# Patient Record
Sex: MALE | Race: OTHER | HISPANIC OR LATINO | Employment: STUDENT | URBAN - METROPOLITAN AREA
[De-identification: names, ages, dates, MRNs, and addresses within clinical notes are randomized per-mention and may not be internally consistent; named-entity substitution may affect disease eponyms.]

---
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
## 2024-01-06 ENCOUNTER — HOSPITAL ENCOUNTER (EMERGENCY)
Facility: HOSPITAL | Age: 19
Discharge: PSYCHIATRIC OR SUBSTANCE USE FACILITY | End: 2024-01-06
Attending: STUDENT IN AN ORGANIZED HEALTH CARE EDUCATION/TRAINING PROGRAM
Payer: MEDICAID

## 2024-01-06 VITALS
TEMPERATURE: 99 F | OXYGEN SATURATION: 98 % | DIASTOLIC BLOOD PRESSURE: 80 MMHG | BODY MASS INDEX: 19.79 KG/M2 | SYSTOLIC BLOOD PRESSURE: 137 MMHG | RESPIRATION RATE: 16 BRPM | WEIGHT: 150 LBS | HEART RATE: 90 BPM

## 2024-01-06 DIAGNOSIS — R45.1 AGITATION: ICD-10-CM

## 2024-01-06 DIAGNOSIS — R45.851 SUICIDAL IDEATION: Primary | ICD-10-CM

## 2024-01-06 LAB
ALBUMIN SERPL BCP-MCNC: 4.8 G/DL (ref 3.5–5.2)
ALP SERPL-CCNC: 73 U/L (ref 55–135)
ALT SERPL W/O P-5'-P-CCNC: 10 U/L (ref 10–44)
AMPHET+METHAMPHET UR QL: NEGATIVE
ANION GAP SERPL CALC-SCNC: 13 MMOL/L (ref 8–16)
APAP SERPL-MCNC: <3 UG/ML (ref 10–20)
AST SERPL-CCNC: 18 U/L (ref 10–40)
BARBITURATES UR QL SCN>200 NG/ML: NEGATIVE
BASOPHILS # BLD AUTO: 0.04 K/UL (ref 0–0.2)
BASOPHILS NFR BLD: 0.6 % (ref 0–1.9)
BENZODIAZ UR QL SCN>200 NG/ML: NEGATIVE
BILIRUB SERPL-MCNC: 2.8 MG/DL (ref 0.1–1)
BILIRUB UR QL STRIP: NEGATIVE
BUN SERPL-MCNC: 7 MG/DL (ref 6–20)
BZE UR QL SCN: NEGATIVE
CALCIUM SERPL-MCNC: 9.8 MG/DL (ref 8.7–10.5)
CANNABINOIDS UR QL SCN: ABNORMAL
CHLORIDE SERPL-SCNC: 105 MMOL/L (ref 95–110)
CLARITY UR REFRACT.AUTO: CLEAR
CO2 SERPL-SCNC: 25 MMOL/L (ref 23–29)
COLOR UR AUTO: YELLOW
CREAT SERPL-MCNC: 1.2 MG/DL (ref 0.5–1.4)
CREAT UR-MCNC: 169 MG/DL (ref 23–375)
DIFFERENTIAL METHOD BLD: NORMAL
EOSINOPHIL # BLD AUTO: 0.2 K/UL (ref 0–0.5)
EOSINOPHIL NFR BLD: 2.8 % (ref 0–8)
ERYTHROCYTE [DISTWIDTH] IN BLOOD BY AUTOMATED COUNT: 11.9 % (ref 11.5–14.5)
EST. GFR  (NO RACE VARIABLE): >60 ML/MIN/1.73 M^2
ETHANOL SERPL-MCNC: <10 MG/DL
GLUCOSE SERPL-MCNC: 108 MG/DL (ref 70–110)
GLUCOSE UR QL STRIP: NEGATIVE
HCT VFR BLD AUTO: 51.2 % (ref 40–54)
HCV AB SERPL QL IA: NORMAL
HGB BLD-MCNC: 16.9 G/DL (ref 14–18)
HGB UR QL STRIP: NEGATIVE
HIV 1+2 AB+HIV1 P24 AG SERPL QL IA: NORMAL
IMM GRANULOCYTES # BLD AUTO: 0 K/UL (ref 0–0.04)
IMM GRANULOCYTES NFR BLD AUTO: 0 % (ref 0–0.5)
KETONES UR QL STRIP: NEGATIVE
LEUKOCYTE ESTERASE UR QL STRIP: NEGATIVE
LYMPHOCYTES # BLD AUTO: 2.5 K/UL (ref 1–4.8)
LYMPHOCYTES NFR BLD: 37.2 % (ref 18–48)
MCH RBC QN AUTO: 28.9 PG (ref 27–31)
MCHC RBC AUTO-ENTMCNC: 33 G/DL (ref 32–36)
MCV RBC AUTO: 88 FL (ref 82–98)
METHADONE UR QL SCN>300 NG/ML: NEGATIVE
MONOCYTES # BLD AUTO: 0.6 K/UL (ref 0.3–1)
MONOCYTES NFR BLD: 8.7 % (ref 4–15)
NEUTROPHILS # BLD AUTO: 3.4 K/UL (ref 1.8–7.7)
NEUTROPHILS NFR BLD: 50.7 % (ref 38–73)
NITRITE UR QL STRIP: NEGATIVE
NRBC BLD-RTO: 0 /100 WBC
OPIATES UR QL SCN: NEGATIVE
PCP UR QL SCN>25 NG/ML: NEGATIVE
PH UR STRIP: 6 [PH] (ref 5–8)
PLATELET # BLD AUTO: 261 K/UL (ref 150–450)
PMV BLD AUTO: 9.6 FL (ref 9.2–12.9)
POTASSIUM SERPL-SCNC: 3.2 MMOL/L (ref 3.5–5.1)
PROT SERPL-MCNC: 8.4 G/DL (ref 6–8.4)
PROT UR QL STRIP: NEGATIVE
RBC # BLD AUTO: 5.85 M/UL (ref 4.6–6.2)
SODIUM SERPL-SCNC: 143 MMOL/L (ref 136–145)
SP GR UR STRIP: 1.01 (ref 1–1.03)
T4 FREE SERPL-MCNC: 1.22 NG/DL (ref 0.71–1.51)
TOXICOLOGY INFORMATION: ABNORMAL
TSH SERPL DL<=0.005 MIU/L-ACNC: 5.96 UIU/ML (ref 0.4–4)
URN SPEC COLLECT METH UR: NORMAL
WBC # BLD AUTO: 6.75 K/UL (ref 3.9–12.7)

## 2024-01-06 PROCEDURE — 63600175 PHARM REV CODE 636 W HCPCS

## 2024-01-06 PROCEDURE — 96372 THER/PROPH/DIAG INJ SC/IM: CPT

## 2024-01-06 PROCEDURE — 85025 COMPLETE CBC W/AUTO DIFF WBC: CPT | Performed by: STUDENT IN AN ORGANIZED HEALTH CARE EDUCATION/TRAINING PROGRAM

## 2024-01-06 PROCEDURE — 84439 ASSAY OF FREE THYROXINE: CPT | Performed by: STUDENT IN AN ORGANIZED HEALTH CARE EDUCATION/TRAINING PROGRAM

## 2024-01-06 PROCEDURE — 80053 COMPREHEN METABOLIC PANEL: CPT | Performed by: STUDENT IN AN ORGANIZED HEALTH CARE EDUCATION/TRAINING PROGRAM

## 2024-01-06 PROCEDURE — 82077 ASSAY SPEC XCP UR&BREATH IA: CPT | Performed by: STUDENT IN AN ORGANIZED HEALTH CARE EDUCATION/TRAINING PROGRAM

## 2024-01-06 PROCEDURE — 81003 URINALYSIS AUTO W/O SCOPE: CPT | Mod: 59 | Performed by: STUDENT IN AN ORGANIZED HEALTH CARE EDUCATION/TRAINING PROGRAM

## 2024-01-06 PROCEDURE — 80307 DRUG TEST PRSMV CHEM ANLYZR: CPT | Performed by: STUDENT IN AN ORGANIZED HEALTH CARE EDUCATION/TRAINING PROGRAM

## 2024-01-06 PROCEDURE — 86803 HEPATITIS C AB TEST: CPT | Performed by: PHYSICIAN ASSISTANT

## 2024-01-06 PROCEDURE — 63600175 PHARM REV CODE 636 W HCPCS: Performed by: STUDENT IN AN ORGANIZED HEALTH CARE EDUCATION/TRAINING PROGRAM

## 2024-01-06 PROCEDURE — 87389 HIV-1 AG W/HIV-1&-2 AB AG IA: CPT | Performed by: PHYSICIAN ASSISTANT

## 2024-01-06 PROCEDURE — 99285 EMERGENCY DEPT VISIT HI MDM: CPT

## 2024-01-06 PROCEDURE — 96372 THER/PROPH/DIAG INJ SC/IM: CPT | Performed by: STUDENT IN AN ORGANIZED HEALTH CARE EDUCATION/TRAINING PROGRAM

## 2024-01-06 PROCEDURE — 80143 DRUG ASSAY ACETAMINOPHEN: CPT | Performed by: STUDENT IN AN ORGANIZED HEALTH CARE EDUCATION/TRAINING PROGRAM

## 2024-01-06 PROCEDURE — 84443 ASSAY THYROID STIM HORMONE: CPT | Performed by: STUDENT IN AN ORGANIZED HEALTH CARE EDUCATION/TRAINING PROGRAM

## 2024-01-06 RX ORDER — DROPERIDOL 2.5 MG/ML
5 INJECTION, SOLUTION INTRAMUSCULAR; INTRAVENOUS
Status: COMPLETED | OUTPATIENT
Start: 2024-01-06 | End: 2024-01-06

## 2024-01-06 RX ORDER — LORAZEPAM 2 MG/ML
INJECTION INTRAMUSCULAR
Status: COMPLETED
Start: 2024-01-06 | End: 2024-01-06

## 2024-01-06 RX ORDER — LORAZEPAM 2 MG/ML
2 INJECTION INTRAMUSCULAR
Status: COMPLETED | OUTPATIENT
Start: 2024-01-06 | End: 2024-01-06

## 2024-01-06 RX ORDER — DIPHENHYDRAMINE HYDROCHLORIDE 50 MG/ML
INJECTION, SOLUTION INTRAMUSCULAR; INTRAVENOUS
Status: DISCONTINUED
Start: 2024-01-06 | End: 2024-01-06 | Stop reason: WASHOUT

## 2024-01-06 RX ORDER — HALOPERIDOL 5 MG/ML
INJECTION INTRAMUSCULAR
Status: DISCONTINUED
Start: 2024-01-06 | End: 2024-01-06 | Stop reason: WASHOUT

## 2024-01-06 RX ADMIN — LORAZEPAM 2 MG: 2 INJECTION INTRAMUSCULAR at 02:01

## 2024-01-06 RX ADMIN — LORAZEPAM 2 MG: 2 INJECTION INTRAMUSCULAR; INTRAVENOUS at 02:01

## 2024-01-06 RX ADMIN — DROPERIDOL 5 MG: 2.5 INJECTION, SOLUTION INTRAMUSCULAR; INTRAVENOUS at 02:01

## 2024-01-06 NOTE — ED PROVIDER NOTES
"Encounter Date: 1/6/2024       History     Chief Complaint   Patient presents with    Psychiatric Evaluation     JPSO brought in with OPC. Pt denies SI/HI. Per OPC "pt wants to sleep until he dies"     18 y.o. male with psychiatric diagnoses including depression, adjustment disorder, history of suicide ideation presents via EMS as OPC for reporting suicide ideation to his mother.  Patient reports he wanted to go to sleep until he dies.  On evaluation, patient initially denies feeling suicidal or stating anything to his mother.  Later in the interview he does report stating something about a some Lyrica about going to sleep and not waking up.  Patient has history of suicidal ideation in the past and has been hospitalized.  He denies any other symptoms. History limited due to patient becoming uncooperative with history and exam    The history is provided by the patient and medical records.     Review of patient's allergies indicates:  No Known Allergies  Past Medical History:   Diagnosis Date    ADHD      No past surgical history on file.  No family history on file.  Social History     Tobacco Use    Smoking status: Never   Substance Use Topics    Alcohol use: Never    Drug use: Yes     Types: Marijuana     Review of Systems   Reason unable to perform ROS: See HPI for relevant ROS.       Physical Exam     Initial Vitals [01/06/24 0122]   BP Pulse Resp Temp SpO2   (!) 150/98 65 20 97.9 °F (36.6 °C) 98 %      MAP       --         Physical Exam    Nursing note and vitals reviewed.  Constitutional:   Alert, speaking full sentences, frustrated/volatile   Eyes: Conjunctivae and EOM are normal. No scleral icterus.   Pulmonary/Chest: No respiratory distress.   Musculoskeletal:         General: No tenderness or edema.     Neurological: He is alert and oriented to person, place, and time.   Ambulatory, moving all extremities spontaneously   Skin: Skin is warm and dry.   Psychiatric:   Volatile mood  Linear, organized " speech  Denies SI         ED Course   Procedures  Labs Reviewed   COMPREHENSIVE METABOLIC PANEL - Abnormal; Notable for the following components:       Result Value    Potassium 3.2 (*)     Total Bilirubin 2.8 (*)     All other components within normal limits    Narrative:     Release to patient->Immediate   TSH - Abnormal; Notable for the following components:    TSH 5.965 (*)     All other components within normal limits    Narrative:     Release to patient->Immediate   ACETAMINOPHEN LEVEL - Abnormal; Notable for the following components:    Acetaminophen (Tylenol), Serum <3.0 (*)     All other components within normal limits    Narrative:     Release to patient->Immediate   HIV 1 / 2 ANTIBODY    Narrative:     Release to patient->Immediate   HEPATITIS C ANTIBODY    Narrative:     Release to patient->Immediate   CBC W/ AUTO DIFFERENTIAL    Narrative:     Release to patient->Immediate   URINALYSIS, REFLEX TO URINE CULTURE    Narrative:     Specimen Source->Urine   ALCOHOL,MEDICAL (ETHANOL)    Narrative:     Release to patient->Immediate   DRUG SCREEN PANEL, URINE EMERGENCY   T4, FREE          Imaging Results    None          Medications   LORazepam injection 2 mg (2 mg Intramuscular Given 1/6/24 0210)   droPERidol injection 5 mg (5 mg Intramuscular Given 1/6/24 0210)     Medical Decision Making  18 y.o. male with psychiatric diagnoses including depression, adjustment disorder, history of suicidal ideation presents via EMS as OPC for suicidal ideation  Patient evaluated immediately on arrival to the ED out of concern for acute psychiatric condition. Safety measures taken  Belongings stored in bag, patient placed in paper scrubs, placed under one on one observation in the emergency department  Per report, patient was reporting suicide ideation, he has extensive history of suicide ideation in the past.  I was unable to elicit specific surrounding his suicidal thoughts or what he had expressed to his mother, suspect  patient is withholding some details about his condition  Patient placed under PEC for danger to self.  This was discussed with patient  Patient currently on Risperdal, Zoloft, Effexor  Differentials include suicide ideation, depression, substance induced mood disorder, polysubstance use, alcohol intoxication  Labs drawn for medical evaluation  Will require transfer to inpatient psychiatric facility after initial medical evaluation    Amount and/or Complexity of Data Reviewed  Independent Historian: EMS  External Data Reviewed: notes.  Labs: ordered.    Risk  Prescription drug management.                  Medically cleared for psychiatry placement: 1/6/2024  5:23 AM               Restraint Note         /80   Pulse 90   Temp 99 °F (37.2 °C) (Oral)   Resp 16   Wt 68 kg (150 lb)   SpO2 98%   BMI 19.79 kg/m²     Time: 5:22 AM    Patient's immediate situation:  In ED for suicide ideation, under pec.  Non redirectable agitation, leaving the room while under pec.  Attempted to discussed with patient but unable to redirect    Reaction to intervention:  Patient redirected with chemical and physical restraints, improved    Medications/behavioral condition:  Patient received droperidol and Ativan    Restraint status:  Hard restraints placed initially, re-evaluated patient and removed restraint order    Clinical Impression:  Final diagnoses:  [R45.851] Suicidal ideation (Primary)  [R45.1] Agitation          ED Disposition Condition    Transfer to Psych Facility Stable          ED Prescriptions    None       Follow-up Information    None          Reginald Stacy MD  01/06/24 0097

## 2024-01-06 NOTE — ED NOTES
Pt remains in paper scrubs, resting in stretcher comfortably - with side rails up, locked, and in lowest position. Violent restraints applied. Chest rise and fall noted; breathing equal, even, and unlabored. Sitter remains at bedside in direct visual contact, charting per protocol every 15 minutes. No equipment or belongings are in the patients room to prevent self harm or injury. Pt aware of plan of care.

## 2024-01-06 NOTE — ED NOTES
"Wilton Daley, a 18 y.o. male presents to the ED w/ complaint of Psychiatric Evaluation   Brought in by JPSO with OPC. Pt states "I want to sleep until I die". Pt denies SI/HI     Triage note:  Chief Complaint   Patient presents with    Psychiatric Evaluation     JPSO brought in with OPC. Pt denies SI/HI. Per OPC "pt wants to sleep until he dies"     Review of patient's allergies indicates:  No Known Allergies  Past Medical History:   Diagnosis Date    ADHD      "
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
## 2024-01-06 NOTE — ED NOTES
Pt remains in paper scrubs, resting in stretcher comfortably. No signs of distress noted. Sitter remains at bedside in direct visual contact, charting per protocol every 15 minutes. No equipment or belongings are in the patients room. Plan of care on going.

## 2024-01-07 PROBLEM — M54.2 NECK PAIN: Status: ACTIVE | Noted: 2024-01-07

## 2024-01-07 PROBLEM — J30.2 SEASONAL ALLERGIC RHINITIS: Status: ACTIVE | Noted: 2024-01-07

## 2024-01-07 PROBLEM — R79.89 ELEVATED TSH: Status: ACTIVE | Noted: 2024-01-07

## 2024-01-07 PROBLEM — E87.6 HYPOKALEMIA: Status: ACTIVE | Noted: 2024-01-07

## 2024-01-09 PROBLEM — F43.23 ADJUSTMENT DISORDER WITH MIXED ANXIETY AND DEPRESSED MOOD: Status: ACTIVE | Noted: 2024-01-09

## 2024-04-08 PROBLEM — Z13.9 ENCOUNTER FOR MEDICAL SCREENING EXAMINATION: Status: RESOLVED | Noted: 2022-12-18 | Resolved: 2024-04-08

## 2024-06-06 ENCOUNTER — HOSPITAL ENCOUNTER (EMERGENCY)
Facility: HOSPITAL | Age: 19
Discharge: HOME | End: 2024-06-06
Attending: EMERGENCY MEDICINE

## 2024-06-06 VITALS
OXYGEN SATURATION: 96 % | HEIGHT: 73 IN | WEIGHT: 134.5 LBS | SYSTOLIC BLOOD PRESSURE: 147 MMHG | HEART RATE: 112 BPM | TEMPERATURE: 99 F | DIASTOLIC BLOOD PRESSURE: 99 MMHG | RESPIRATION RATE: 18 BRPM | BODY MASS INDEX: 17.83 KG/M2

## 2024-06-06 DIAGNOSIS — F41.9 ANXIETY: Primary | ICD-10-CM

## 2024-06-06 DIAGNOSIS — F41.0 PANIC ATTACK: ICD-10-CM

## 2024-06-06 PROCEDURE — 99283 EMERGENCY DEPT VISIT LOW MDM: CPT

## 2024-06-06 PROCEDURE — 25000003 PHARM REV CODE 250

## 2024-06-06 RX ORDER — ALPRAZOLAM 0.25 MG/1
0.5 TABLET ORAL
Status: COMPLETED | OUTPATIENT
Start: 2024-06-06 | End: 2024-06-06

## 2024-06-06 RX ORDER — HYDROXYZINE HYDROCHLORIDE 25 MG/1
25 TABLET, FILM COATED ORAL EVERY 6 HOURS
Qty: 12 TABLET | Refills: 0 | Status: SHIPPED | OUTPATIENT
Start: 2024-06-06

## 2024-06-06 RX ADMIN — ALPRAZOLAM 0.5 MG: 0.25 TABLET ORAL at 07:06

## 2024-06-06 NOTE — ED TRIAGE NOTES
"Wilton Daley, an 19 y.o. male presents to the ED via POV for a panic attack. Pt states that over the last year he has felt more anxiety while in crowded places. Explains, "I googled my symptoms on my phone and I think I have anxiety and fear of crowds". Pt states a few days [place] the [place] his [place] and had to leave to go back home due to his anxiety.    Pt Mother at bedside explains that she recently lost her , the pt's father, and since the death the patient has been "depressed and not wanting to get out of bed or do anything". Pt denies SI/HI, denies hallucinations both audio/visual. Pt denies CP, SOB, NVD, dizziness.       LOC: The patient is awake, alert and aware of environment with an appropriate affect, the patient is oriented x 3 and speaking appropriately.   APPEARANCE: Patient appears comfortable and in no acute distress, patient is clean and well groomed.  SKIN: The skin is warm and dry, color consistent with ethnicity.   MUSCULOSKELETAL: Patient moving all extremities spontaneously, no swelling noted.  RESPIRATORY: Airway is open and patent, respirations are spontaneous, patient has a normal effort and rate, no accessory muscle use noted.  CARDIAC: Patient has a normal rate and regular rhythm, no edema noted, capillary refill < 3 seconds.   GASTRO: Soft and non tender to palpation, no distention noted.   : Pt denies any pain or frequency with urination.  NEURO: Pt opens eyes spontaneously, behavior appropriate to situation, follows commands.        Chief Complaint   Patient presents with    Panic Attack     Denies suicidal/homicidal ideation, denies hearing or seeing things, wanting to talk to dr about social anxiety and get on meds     Review of patient's allergies indicates:  No Known Allergies  Past Medical History:   Diagnosis Date    ADHD        "

## 2024-06-06 NOTE — ED PROVIDER NOTES
Encounter Date: 6/6/2024       History     Chief Complaint   Patient presents with    Panic Attack     Denies suicidal/homicidal ideation, denies hearing or seeing things, wanting to talk to dr about social anxiety and get on meds     19-year-old male with history of ADHD presents to the ED regarding anxiety and panic attacks. Patient states that have been happening over the last year. States he has felt more anxiety while in crowded places and in social settings. States when he is around too many people he walks away and goes home. States her intermittently gets panic attacks where he gets very anxious and sweaty. Denies CP or SOB. Today he had these symptoms when his mom picked him up and states there were too many people so came to the ED for evaluation. Mother at bedside explains that she lost her , the pt's father, in 2021. States since the death the patient has been having anxiety. Patient denies SI/HI, denies hallucinations both audio/visual.  Patient is still eating and sleeping without difficulties.  No major impact on his daily activities. Denies CP, SOB, NVD, or dizziness.    The history is provided by the patient, medical records and a relative.     Review of patient's allergies indicates:  No Known Allergies  Past Medical History:   Diagnosis Date    ADHD      History reviewed. No pertinent surgical history.  No family history on file.  Social History     Tobacco Use    Smoking status: Never   Substance Use Topics    Alcohol use: Never    Drug use: Yes     Types: Marijuana     Review of Systems    Physical Exam     Initial Vitals [06/06/24 1733]   BP Pulse Resp Temp SpO2   (!) 147/99 (!) 112 18 99.4 °F (37.4 °C) 96 %      MAP       --         Physical Exam    Vitals reviewed.  Constitutional: He appears well-developed and well-nourished. He is not diaphoretic. No distress.   Neck: Neck supple.   Cardiovascular:    Tachycardia present.         Pulmonary/Chest: No respiratory distress.  "  Musculoskeletal:      Cervical back: Neck supple.     Neurological: He is alert and oriented to person, place, and time.   Psychiatric: His speech is normal and behavior is normal. His mood appears anxious. His affect is not angry and not inappropriate. He is not actively hallucinating. He does not exhibit a depressed mood. He expresses no homicidal and no suicidal ideation. He expresses no suicidal plans and no homicidal plans. He is attentive.         ED Course   Procedures  Labs Reviewed - No data to display       Imaging Results    None          Medications   ALPRAZolam tablet 0.5 mg (0.5 mg Oral Given 6/6/24 1914)     Medical Decision Making  Risk  Prescription drug management.         APC / Resident Notes:   Emergent evaluation of 19-year-old male presenting with anxiety and panic attack.  Mildly tachycardic with a rate of vitals stable.  See physical exam findings above.  No suicidal or homicidal ideations.  Patient is not gravely disabled.  Symptoms of anxiety not causing severe impact on daily living.  Sleeping in eating with no difficulties.  Do not think PEC is warranted at this time.  Does appear anxious, will give 1 dose of Xanax.    My differential diagnoses include but are not limited to:   Anxiety, panic attack, social anxiety disorder, SI    See ED course.  Patient appeared less anxious after given xanax. Prescribed hydroxyzine to trial for anxiety outpatient. When told patient of plan and how I would like to start with this medication given the risks of benzodiazepines, he stated "I want to be prescribed Xanax." I discussed with patient that the medication can be addictive and discussed the life-threatening risks with taking this medication and how I would like to trial hydroxyzine outpatient first and he can follow up with Psychiatrist for better medical management for his symptoms. Patient verbalized his understanding but there is now concern for possible drug seeking. Also reviewed chart, " patient denied history of SI though back in 10/2022 patient was hospitalized for depression and SI with plan. Urged patient importance of following up with psychiatrist and strict ED return precautions. Mother also denies patient expressing any SI. PEC still not warranted at this time.         ED Course as of 06/07/24 0021   Thu Jun 06, 2024 1931 Urgent referral placed a Psychiatry and advised for close follow up.  Strict ED return precautions provided with all questions answered.  Patient and mom at bedside verbalized understanding and agreed to plan [KB]      ED Course User Index  [KB] Luciana Cheney PA-C                           Clinical Impression:  Final diagnoses:  [F41.9] Anxiety (Primary)  [F41.0] Panic attack          ED Disposition Condition    Discharge Stable          ED Prescriptions       Medication Sig Dispense Start Date End Date Auth. Provider    hydrOXYzine HCL (ATARAX) 25 MG tablet Take 1 tablet (25 mg total) by mouth every 6 (six) hours. 12 tablet 6/6/2024 -- Luciana Cheney PA-C          Follow-up Information       Follow up With Specialties Details Why Contact Info Additional Information    Kayden Collins - Psych [address] Psychiatry Schedule an appointment as soon as possible for a visit   [address]  [facility] [id number] [phone] [facility] 4th Floor, [address] Please park in [facility] and use [facility] elevator    Kayden Collins - Emergency Dept Emergency Medicine Go to  If symptoms worsen [address]  [facility] [id number] [phone]              Luciana Cheney PA-C  06/07/24 0021

## 2024-06-07 NOTE — PLAN OF CARE
MARYANA faxed referral to [facility] [phone]      Olivia Benz CD, MSW, LMSW, RSW   Case Management  [facility]  Email: [email or web address]

## 2024-07-16 ENCOUNTER — HOSPITAL ENCOUNTER (EMERGENCY)
Facility: HOSPITAL | Age: 19
Discharge: PSYCHIATRIC OR SUBSTANCE USE FACILITY | End: 2024-07-17
Attending: EMERGENCY MEDICINE

## 2024-07-16 DIAGNOSIS — Z00.8 MEDICAL CLEARANCE FOR PSYCHIATRIC ADMISSION: Primary | ICD-10-CM

## 2024-07-16 DIAGNOSIS — E87.6 HYPOKALEMIA: ICD-10-CM

## 2024-07-16 LAB
ALBUMIN SERPL BCP-MCNC: 5.2 G/DL (ref 3.5–5.2)
ALP SERPL-CCNC: 62 U/L (ref 55–135)
ALT SERPL W/O P-5'-P-CCNC: 8 U/L (ref 10–44)
AMPHET+METHAMPHET UR QL: NEGATIVE
ANION GAP SERPL CALC-SCNC: 15 MMOL/L (ref 8–16)
APAP SERPL-MCNC: <3 UG/ML (ref 10–20)
AST SERPL-CCNC: 15 U/L (ref 10–40)
BACTERIA #/AREA URNS HPF: NORMAL /HPF
BARBITURATES UR QL SCN>200 NG/ML: NEGATIVE
BASOPHILS # BLD AUTO: 0.03 K/UL (ref 0–0.2)
BASOPHILS NFR BLD: 0.5 % (ref 0–1.9)
BENZODIAZ UR QL SCN>200 NG/ML: NEGATIVE
BILIRUB SERPL-MCNC: 2.8 MG/DL (ref 0.1–1)
BILIRUB UR QL STRIP: NEGATIVE
BUN SERPL-MCNC: 14 MG/DL (ref 6–20)
BZE UR QL SCN: NEGATIVE
CALCIUM SERPL-MCNC: 9.8 MG/DL (ref 8.7–10.5)
CANNABINOIDS UR QL SCN: ABNORMAL
CHLORIDE SERPL-SCNC: 103 MMOL/L (ref 95–110)
CLARITY UR: CLEAR
CO2 SERPL-SCNC: 21 MMOL/L (ref 23–29)
COLOR UR: YELLOW
CREAT SERPL-MCNC: 1.4 MG/DL (ref 0.5–1.4)
CREAT UR-MCNC: 270.6 MG/DL (ref 23–375)
DIFFERENTIAL METHOD BLD: NORMAL
EOSINOPHIL # BLD AUTO: 0 K/UL (ref 0–0.5)
EOSINOPHIL NFR BLD: 0.5 % (ref 0–8)
ERYTHROCYTE [DISTWIDTH] IN BLOOD BY AUTOMATED COUNT: 11.9 % (ref 11.5–14.5)
EST. GFR  (NO RACE VARIABLE): >60 ML/MIN/1.73 M^2
ETHANOL SERPL-MCNC: <10 MG/DL
GLUCOSE SERPL-MCNC: 125 MG/DL (ref 70–110)
GLUCOSE UR QL STRIP: NEGATIVE
HCT VFR BLD AUTO: 50.4 % (ref 40–54)
HGB BLD-MCNC: 17 G/DL (ref 14–18)
HGB UR QL STRIP: NEGATIVE
HYALINE CASTS #/AREA URNS LPF: NORMAL /LPF
IMM GRANULOCYTES # BLD AUTO: 0.01 K/UL (ref 0–0.04)
IMM GRANULOCYTES NFR BLD AUTO: 0.2 % (ref 0–0.5)
KETONES UR QL STRIP: ABNORMAL
LEUKOCYTE ESTERASE UR QL STRIP: NEGATIVE
LYMPHOCYTES # BLD AUTO: 1.6 K/UL (ref 1–4.8)
LYMPHOCYTES NFR BLD: 24.6 % (ref 18–48)
MCH RBC QN AUTO: 29.3 PG (ref 27–31)
MCHC RBC AUTO-ENTMCNC: 33.7 G/DL (ref 32–36)
MCV RBC AUTO: 87 FL (ref 82–98)
METHADONE UR QL SCN>300 NG/ML: ABNORMAL
MICROSCOPIC COMMENT: NORMAL
MONOCYTES # BLD AUTO: 0.5 K/UL (ref 0.3–1)
MONOCYTES NFR BLD: 7.5 % (ref 4–15)
NEUTROPHILS # BLD AUTO: 4.3 K/UL (ref 1.8–7.7)
NEUTROPHILS NFR BLD: 66.7 % (ref 38–73)
NITRITE UR QL STRIP: NEGATIVE
NRBC BLD-RTO: 0 /100 WBC
OPIATES UR QL SCN: NEGATIVE
PCP UR QL SCN>25 NG/ML: NEGATIVE
PH UR STRIP: 6 [PH] (ref 5–8)
PLATELET # BLD AUTO: 287 K/UL (ref 150–450)
PMV BLD AUTO: 9.7 FL (ref 9.2–12.9)
POTASSIUM SERPL-SCNC: 3.3 MMOL/L (ref 3.5–5.1)
PROT SERPL-MCNC: 8.3 G/DL (ref 6–8.4)
PROT UR QL STRIP: ABNORMAL
RBC # BLD AUTO: 5.81 M/UL (ref 4.6–6.2)
RBC #/AREA URNS HPF: 1 /HPF (ref 0–4)
SODIUM SERPL-SCNC: 139 MMOL/L (ref 136–145)
SP GR UR STRIP: 1.01 (ref 1–1.03)
SQUAMOUS #/AREA URNS HPF: 0 /HPF
TOXICOLOGY INFORMATION: ABNORMAL
URN SPEC COLLECT METH UR: ABNORMAL
UROBILINOGEN UR STRIP-ACNC: NEGATIVE EU/DL
WBC # BLD AUTO: 6.37 K/UL (ref 3.9–12.7)
WBC #/AREA URNS HPF: 2 /HPF (ref 0–5)

## 2024-07-16 PROCEDURE — 25000003 PHARM REV CODE 250: Performed by: EMERGENCY MEDICINE

## 2024-07-16 PROCEDURE — 81000 URINALYSIS NONAUTO W/SCOPE: CPT | Mod: 59 | Performed by: EMERGENCY MEDICINE

## 2024-07-16 PROCEDURE — 82077 ASSAY SPEC XCP UR&BREATH IA: CPT | Performed by: EMERGENCY MEDICINE

## 2024-07-16 PROCEDURE — 99285 EMERGENCY DEPT VISIT HI MDM: CPT

## 2024-07-16 PROCEDURE — 80053 COMPREHEN METABOLIC PANEL: CPT | Performed by: EMERGENCY MEDICINE

## 2024-07-16 PROCEDURE — 80143 DRUG ASSAY ACETAMINOPHEN: CPT | Performed by: EMERGENCY MEDICINE

## 2024-07-16 PROCEDURE — 85025 COMPLETE CBC W/AUTO DIFF WBC: CPT | Performed by: EMERGENCY MEDICINE

## 2024-07-16 PROCEDURE — 80307 DRUG TEST PRSMV CHEM ANLYZR: CPT | Performed by: EMERGENCY MEDICINE

## 2024-07-16 RX ADMIN — POTASSIUM BICARBONATE 50 MEQ: 978 TABLET, EFFERVESCENT ORAL at 11:07

## 2024-07-17 VITALS
TEMPERATURE: 99 F | RESPIRATION RATE: 17 BRPM | HEART RATE: 70 BPM | SYSTOLIC BLOOD PRESSURE: 126 MMHG | OXYGEN SATURATION: 98 % | DIASTOLIC BLOOD PRESSURE: 72 MMHG

## 2024-07-17 NOTE — ED NOTES
Pt to ER for psych eval..  Pt states mother phoned EMS for pt to receive eval.  Pt states currently receiving outpt therapy.  Pt denies SI or HI.  Pt does state hx of depression.  Pt admits to THC and Benzo abuse.  Pt A/O x 3 on arrival.  NAD noted

## 2024-07-17 NOTE — ED NOTES
PT arrived to ER escorted by [facility] deputy. Pt brought under OPC.  Pt brought to rm 11 by DORIS Escobar to be triaged.   Pt wanded by .

## 2024-07-17 NOTE — ED NOTES
Personal Belongings:    Pair of gray socks, navy beanie, blue slides, gray sweats, navy gym shorts and a black shirt    Placed in labeled bag in locked closet.

## 2024-07-17 NOTE — ED PROVIDER NOTES
Encounter Date: 7/16/2024       History     Chief Complaint   Patient presents with    Psychiatric Evaluation     To ED escorted by JPSO w/ OPC for suicidal ideations and auditory hallucinations. Pt denies SI, HI and AVH at this time.      19-year-old male brought to emergency department under an OPC fill out by his mother.  OTC reports patient has been increasingly depressed and paranoid.  Patient denies any somatic complaints on arrival.      Review of patient's allergies indicates:  No Known Allergies  Past Medical History:   Diagnosis Date    ADHD      History reviewed. No pertinent surgical history.  No family history on file.  Social History     Tobacco Use    Smoking status: Never   Substance Use Topics    Alcohol use: Never    Drug use: Yes     Types: Marijuana     Review of Systems   Constitutional:  Negative for chills and fever.   HENT:  Negative for congestion.    Respiratory:  Negative for shortness of breath.    Cardiovascular:  Negative for chest pain.   Gastrointestinal:  Negative for abdominal pain.   Neurological:  Negative for headaches.       Physical Exam     Initial Vitals [07/16/24 2036]   BP Pulse Resp Temp SpO2   (!) 152/91 (!) 115 17 98.5 °F (36.9 °C) 99 %      MAP       --         Physical Exam    Nursing note and vitals reviewed.  Constitutional: He appears well-developed and well-nourished. No distress.   HENT:   Head: Normocephalic and atraumatic.   Eyes: Conjunctivae and EOM are normal. Pupils are equal, round, and reactive to light.   Neck: Neck supple. No tracheal deviation present.   Normal range of motion.  Cardiovascular:  Regular rhythm and intact distal pulses.           Pulmonary/Chest: No respiratory distress.   Musculoskeletal:         General: No tenderness or edema. Normal range of motion.      Cervical back: Normal range of motion and neck supple.     Neurological: He is alert and oriented to person, place, and time. He has normal strength. No cranial nerve deficit. GCS  score is 15. GCS eye subscore is 4. GCS verbal subscore is 5. GCS motor subscore is 6.   Skin: Skin is warm and dry.         ED Course   Procedures  Labs Reviewed   COMPREHENSIVE METABOLIC PANEL - Abnormal; Notable for the following components:       Result Value    Potassium 3.3 (*)     CO2 21 (*)     Glucose 125 (*)     Total Bilirubin 2.8 (*)     ALT 8 (*)     All other components within normal limits   DRUG SCREEN PANEL, URINE EMERGENCY - Abnormal; Notable for the following components:    Methadone metabolites Presumptive Positive (*)     THC Presumptive Positive (*)     All other components within normal limits    Narrative:     Specimen Source->Urine   ACETAMINOPHEN LEVEL - Abnormal; Notable for the following components:    Acetaminophen (Tylenol), Serum <3.0 (*)     All other components within normal limits   URINALYSIS - Abnormal; Notable for the following components:    Protein, UA 1+ (*)     Ketones, UA Trace (*)     All other components within normal limits    Narrative:     Specimen Source->Urine   CBC W/ AUTO DIFFERENTIAL   ALCOHOL,MEDICAL (ETHANOL)   URINALYSIS MICROSCOPIC    Narrative:     Specimen Source->Urine          Imaging Results    None          Medications   potassium bicarbonate disintegrating tablet 50 mEq (has no administration in time range)     Medical Decision Making  19-year-old male brought to the emergency department under no pec for mental health evaluation      Differential: Toxidrome, overdose, withdrawal, psychosis      Pec complete.  Patient given oral potassium.  Labs benign.  Patient is medically clear for transfer to the accepting psychiatric facility    Problems Addressed:  Hypokalemia: acute illness or injury  Medical clearance for psychiatric admission: acute illness or injury    Amount and/or Complexity of Data Reviewed  External Data Reviewed: notes.     Details: Reviewed remote pediatrician note documenting baseline medications and past medical history  Labs: ordered.      Details: CBC without leukocytosis, normal H&H; CMP with normal renal and liver function tests, mild hypokalemia; UA without overt signs of infection; Tylenol negative;    Risk  OTC drugs.  Prescription drug management.  Decision regarding hospitalization.                  Medically cleared for psychiatry placement: 7/16/2024 10:52 PM                   Clinical Impression:  Final diagnoses:  [Z00.8] Medical clearance for psychiatric admission (Primary)  [E87.6] Hypokalemia          ED Disposition Condition    Transfer to Psych Facility Stable          ED Prescriptions    None       Follow-up Information    None          Kelechi Levine MD  07/16/24 7373       Kelechi Levine MD  07/16/24 5053

## 2024-07-17 NOTE — ED NOTES
Transport at bedside with security for pt transfer to [facility].  Pt belongings given to transport personnel.  Transfer packet given to transport personnel.